# Patient Record
Sex: MALE | Race: WHITE | NOT HISPANIC OR LATINO | Employment: UNEMPLOYED | URBAN - NONMETROPOLITAN AREA
[De-identification: names, ages, dates, MRNs, and addresses within clinical notes are randomized per-mention and may not be internally consistent; named-entity substitution may affect disease eponyms.]

---

## 2022-12-23 ENCOUNTER — HOSPITAL ENCOUNTER (EMERGENCY)
Facility: HOSPITAL | Age: 42
Discharge: HOME | End: 2022-12-23
Attending: EMERGENCY MEDICINE

## 2022-12-23 ENCOUNTER — APPOINTMENT (EMERGENCY)
Dept: CT IMAGING | Facility: HOSPITAL | Age: 42
End: 2022-12-23

## 2022-12-23 VITALS
RESPIRATION RATE: 18 BRPM | OXYGEN SATURATION: 98 % | HEART RATE: 79 BPM | DIASTOLIC BLOOD PRESSURE: 67 MMHG | TEMPERATURE: 96.5 F | SYSTOLIC BLOOD PRESSURE: 113 MMHG

## 2022-12-23 DIAGNOSIS — I95.2 HYPOTENSION DUE TO MEDICATION: ICD-10-CM

## 2022-12-23 DIAGNOSIS — I95.1 ORTHOSTATIC HYPOTENSION: ICD-10-CM

## 2022-12-23 DIAGNOSIS — R42 DIZZINESS: Primary | ICD-10-CM

## 2022-12-23 LAB
ALBUMIN SERPL BCP-MCNC: 3.8 G/DL (ref 3.5–5)
ALP SERPL-CCNC: 90 U/L (ref 46–116)
ALT SERPL W P-5'-P-CCNC: 19 U/L (ref 12–78)
ANION GAP SERPL CALCULATED.3IONS-SCNC: 4 MMOL/L (ref 4–13)
APTT PPP: 28 SECONDS (ref 23–37)
AST SERPL W P-5'-P-CCNC: 13 U/L (ref 5–45)
ATRIAL RATE: 90 BPM
BASOPHILS # BLD AUTO: 0.04 THOUSANDS/ÂΜL (ref 0–0.1)
BASOPHILS NFR BLD AUTO: 1 % (ref 0–1)
BILIRUB SERPL-MCNC: 0.88 MG/DL (ref 0.2–1)
BUN SERPL-MCNC: 18 MG/DL (ref 5–25)
CALCIUM SERPL-MCNC: 8.6 MG/DL (ref 8.3–10.1)
CARDIAC TROPONIN I PNL SERPL HS: <2 NG/L
CHLORIDE SERPL-SCNC: 99 MMOL/L (ref 96–108)
CO2 SERPL-SCNC: 33 MMOL/L (ref 21–32)
CREAT SERPL-MCNC: 0.83 MG/DL (ref 0.6–1.3)
EOSINOPHIL # BLD AUTO: 0.07 THOUSAND/ÂΜL (ref 0–0.61)
EOSINOPHIL NFR BLD AUTO: 1 % (ref 0–6)
ERYTHROCYTE [DISTWIDTH] IN BLOOD BY AUTOMATED COUNT: 13.5 % (ref 11.6–15.1)
FLUAV RNA RESP QL NAA+PROBE: NEGATIVE
FLUBV RNA RESP QL NAA+PROBE: NEGATIVE
GFR SERPL CREATININE-BSD FRML MDRD: 110 ML/MIN/1.73SQ M
GLUCOSE SERPL-MCNC: 148 MG/DL (ref 65–140)
HCT VFR BLD AUTO: 40.2 % (ref 36.5–49.3)
HGB BLD-MCNC: 13.1 G/DL (ref 12–17)
IMM GRANULOCYTES # BLD AUTO: 0.03 THOUSAND/UL (ref 0–0.2)
IMM GRANULOCYTES NFR BLD AUTO: 0 % (ref 0–2)
INR PPP: 1.09 (ref 0.84–1.19)
LYMPHOCYTES # BLD AUTO: 1.88 THOUSANDS/ÂΜL (ref 0.6–4.47)
LYMPHOCYTES NFR BLD AUTO: 27 % (ref 14–44)
MCH RBC QN AUTO: 26.6 PG (ref 26.8–34.3)
MCHC RBC AUTO-ENTMCNC: 32.6 G/DL (ref 31.4–37.4)
MCV RBC AUTO: 82 FL (ref 82–98)
MONOCYTES # BLD AUTO: 0.63 THOUSAND/ÂΜL (ref 0.17–1.22)
MONOCYTES NFR BLD AUTO: 9 % (ref 4–12)
NEUTROPHILS # BLD AUTO: 4.38 THOUSANDS/ÂΜL (ref 1.85–7.62)
NEUTS SEG NFR BLD AUTO: 62 % (ref 43–75)
NRBC BLD AUTO-RTO: 0 /100 WBCS
P AXIS: 69 DEGREES
PLATELET # BLD AUTO: 184 THOUSANDS/UL (ref 149–390)
PMV BLD AUTO: 12 FL (ref 8.9–12.7)
POTASSIUM SERPL-SCNC: 4.2 MMOL/L (ref 3.5–5.3)
PR INTERVAL: 124 MS
PROT SERPL-MCNC: 7.5 G/DL (ref 6.4–8.4)
PROTHROMBIN TIME: 14.3 SECONDS (ref 11.6–14.5)
QRS AXIS: 47 DEGREES
QRSD INTERVAL: 88 MS
QT INTERVAL: 338 MS
QTC INTERVAL: 413 MS
RBC # BLD AUTO: 4.92 MILLION/UL (ref 3.88–5.62)
RSV RNA RESP QL NAA+PROBE: NEGATIVE
SARS-COV-2 RNA RESP QL NAA+PROBE: NEGATIVE
SODIUM SERPL-SCNC: 136 MMOL/L (ref 135–147)
T WAVE AXIS: 50 DEGREES
VENTRICULAR RATE: 90 BPM
WBC # BLD AUTO: 7.03 THOUSAND/UL (ref 4.31–10.16)

## 2022-12-23 RX ORDER — MECLIZINE HCL 12.5 MG/1
25 TABLET ORAL ONCE
Status: COMPLETED | OUTPATIENT
Start: 2022-12-23 | End: 2022-12-23

## 2022-12-23 RX ADMIN — SODIUM CHLORIDE 1000 ML: 0.9 INJECTION, SOLUTION INTRAVENOUS at 17:00

## 2022-12-23 RX ADMIN — MECLIZINE 25 MG: 12.5 TABLET ORAL at 17:01

## 2022-12-23 NOTE — ED PROVIDER NOTES
History  Chief Complaint   Patient presents with   • Dizziness     Patient reports feeling lightheaded and dizzy the past two days  HPI  45-year-old male presenting to emergency room for evaluation of lightheadedness and dizziness x2 days  Past medical history significant for history of stroke in 2016, speech impairment, seizure disorder on Keppra, hypertension, hyperlipidemia, type 2 diabetes, anxiety presenting to the emergency department with concerns for gradual onset of positionally induced lightheadedness and dizziness  Denies inciting event  Reports no recent seizures last seizure greater than 1 year ago  Denies history of similar symptoms in the past   Patient describes the dizziness as occurring only when he gets up quickly he feels if he would get up very slowly he would not have the symptoms  He does not endorse some sensation of room spinning denies any prior history of vertigo  He denies similar symptoms to this as when he had his prior stroke  Patient has been compliant with his lisinopril he is noted to have low but normal blood pressure here in the ER  None       Past Medical History:   Diagnosis Date   • Ambulatory dysfunction    • Anxiety    • Brain injury    • Depression    • Diabetes mellitus ([facility] [address] )    • Fainting    • GERD (gastroesophageal reflux disease)    • Hyperlipemia    • Seizure ([facility] [address] )    • Speech impairment    • Stammer    • Stroke [facility])        History reviewed  No pertinent surgical history  History reviewed  No pertinent family history  I have reviewed and agree with the history as documented  E-Cigarette/Vaping     E-Cigarette/Vaping Substances     Social History     Tobacco Use   • Smoking status: Never   • Smokeless tobacco: Never   Substance Use Topics   • Alcohol use: Never   • Drug use: Never       Review of Systems   Constitutional: Negative for chills and fever  HENT: Negative for congestion, ear pain, sinus pressure, sinus pain and sore throat      Eyes: Negative for pain and visual disturbance  Respiratory: Negative for cough and shortness of breath  Cardiovascular: Negative for chest pain and palpitations  Gastrointestinal: Negative for abdominal pain, diarrhea, nausea and vomiting  Genitourinary: Negative for dysuria, flank pain and hematuria  Musculoskeletal: Negative for arthralgias, back pain and neck pain  Skin: Negative for color change and rash  Neurological: Positive for dizziness and light-headedness  Negative for tremors, seizures, syncope, facial asymmetry, speech difficulty, weakness, numbness and headaches  All other systems reviewed and are negative  Physical Exam  Physical Exam  Vitals and nursing note reviewed  Exam conducted with a chaperone present  Constitutional:       General: He is not in acute distress  Appearance: Normal appearance  He is well-developed  He is obese  He is not diaphoretic  HENT:      Head: Normocephalic and atraumatic  Right Ear: External ear normal       Left Ear: External ear normal       Nose: Nose normal       Mouth/Throat:      Mouth: Mucous membranes are moist       Pharynx: Oropharynx is clear  Eyes:      Extraocular Movements: Extraocular movements intact  Conjunctiva/sclera: Conjunctivae normal       Pupils: Pupils are equal, round, and reactive to light  Comments: No nystagmus   Cardiovascular:      Rate and Rhythm: Normal rate and regular rhythm  Heart sounds: No murmur heard  Pulmonary:      Effort: Pulmonary effort is normal  No respiratory distress  Breath sounds: Normal breath sounds  Abdominal:      Palpations: Abdomen is soft  Tenderness: There is no abdominal tenderness  There is no guarding or rebound  Musculoskeletal:         General: No swelling  Cervical back: Normal range of motion and neck supple  Right lower leg: No edema  Left lower leg: No edema  Skin:     General: Skin is warm and dry        Capillary Refill: Capillary refill takes less than 2 seconds  Neurological:      General: No focal deficit present  Mental Status: He is alert and oriented to person, place, and time  Mental status is at baseline     Psychiatric:         Mood and Affect: Mood normal          Vital Signs  ED Triage Vitals   Temperature Pulse Respirations Blood Pressure SpO2   12/23/22 1532 12/23/22 1532 12/23/22 1532 12/23/22 1532 12/23/22 1532   (!) 96 5 °F (35 8 °C) 99 18 128/69 96 %      Temp Source Heart Rate Source Patient Position - Orthostatic VS BP Location FiO2 (%)   12/23/22 1532 12/23/22 1600 12/23/22 1532 12/23/22 1532 --   Temporal Monitor Sitting Right arm       Pain Score       12/23/22 1532       No Pain           Vitals:    12/23/22 1710 12/23/22 1712 12/23/22 1715 12/23/22 1730   BP: 116/66 117/67 100/60 128/73   Pulse: 79 81 96 76   Patient Position - Orthostatic VS: Lying - Orthostatic VS Sitting - Orthostatic VS Standing - Orthostatic VS Sitting         Visual Acuity      ED Medications  Medications   sodium chloride 0 9 % bolus 1,000 mL (1,000 mL Intravenous New Bag 12/23/22 1700)   meclizine (ANTIVERT) tablet 25 mg (25 mg Oral Given 12/23/22 1701)       Diagnostic Studies  Results Reviewed     Procedure Component Value Units Date/Time    HS Troponin 0hr (reflex protocol) [[id number]]  (Normal) Collected: 12/23/22 1650    Lab Status: Final result Specimen: Blood Updated: 12/23/22 1716     hs TnI 0hr <2 ng/L     Comprehensive metabolic panel [[id number]]  (Abnormal) Collected: 12/23/22 1650    Lab Status: Final result Specimen: Blood Updated: 12/23/22 1707     Sodium 136 mmol/L      Potassium 4 2 mmol/L      Chloride 99 mmol/L      CO2 33 mmol/L      ANION GAP 4 mmol/L      BUN 18 mg/dL      Creatinine 0 83 mg/dL      Glucose 148 mg/dL      Calcium 8 6 mg/dL      AST 13 U/L      ALT 19 U/L      Alkaline Phosphatase 90 U/L      Total Protein 7 5 g/dL      Albumin 3 8 g/dL      Total Bilirubin 0 88 mg/dL      eGFR 110 ml/min/1 73sq m     Narrative:      National Kidney Disease Foundation guidelines for Chronic Kidney Disease (CKD):   •  Stage 1 with normal or high GFR (GFR > 90 mL/min/1 73 square meters)  •  Stage 2 Mild CKD (GFR = 60-89 mL/min/1 73 square meters)  •  Stage 3A Moderate CKD (GFR = 45-59 mL/min/1 73 square meters)  •  Stage 3B Moderate CKD (GFR = 30-44 mL/min/1 73 square meters)  •  Stage 4 Severe CKD (GFR = 15-29 mL/min/1 73 square meters)  •  Stage 5 End Stage CKD (GFR <15 mL/min/1 73 square meters)  Note: GFR calculation is accurate only with a steady state creatinine    Protime-INR [[id number]]  (Normal) Collected: 12/23/22 1650    Lab Status: Final result Specimen: Blood Updated: 12/23/22 1703     Protime 14 3 seconds      INR 1 09    APTT [[id number]]  (Normal) Collected: 12/23/22 1650    Lab Status: Final result Specimen: Blood Updated: 12/23/22 1703     PTT 28 seconds     CBC and differential [[id number]]  (Abnormal) Collected: 12/23/22 1650    Lab Status: Final result Specimen: Blood Updated: 12/23/22 1653     WBC 7 03 Thousand/uL      RBC 4 92 Million/uL      Hemoglobin 13 1 g/dL      Hematocrit 40 2 %      MCV 82 fL      MCH 26 6 pg      MCHC 32 6 g/dL      RDW 13 5 %      MPV 12 0 fL      Platelets 171 Thousands/uL      nRBC 0 /100 WBCs      Neutrophils Relative 62 %      Immat GRANS % 0 %      Lymphocytes Relative 27 %      Monocytes Relative 9 %      Eosinophils Relative 1 %      Basophils Relative 1 %      Neutrophils Absolute 4 38 Thousands/µL      Immature Grans Absolute 0 03 Thousand/uL      Lymphocytes Absolute 1 88 Thousands/µL      Monocytes Absolute 0 63 Thousand/µL      Eosinophils Absolute 0 07 Thousand/µL      Basophils Absolute 0 04 Thousands/µL     Levetiracetam level [[id number]] Collected: 12/23/22 1650    Lab Status:  In process Specimen: Blood Updated: 12/23/22 1650    FLU/RSV/COVID - if FLU/RSV clinically relevant [[id number]]  (Normal) Collected: 12/23/22 1556    Lab Status: Final result Specimen: Nasopharyngeal Swab Updated: 12/23/22 1638     SARS-CoV-2 Negative     INFLUENZA A PCR Negative     INFLUENZA B PCR Negative     RSV PCR Negative    Narrative:      FOR PEDIATRIC PATIENTS - copy/paste COVID Guidelines URL to browser: [email or web address]  [email or web address]    SARS-CoV-2 assay is a Nucleic Acid Amplification assay intended for the  qualitative detection of nucleic acid from SARS-CoV-2 in nasopharyngeal  swabs  Results are for the presumptive identification of SARS-CoV-2 RNA  Positive results are indicative of infection with SARS-CoV-2, the virus  causing COVID-19, but do not rule out bacterial infection or co-infection  with other viruses  Laboratories within the [place] and its  territories are required to report all positive results to the appropriate  public health authorities  Negative results do not preclude SARS-CoV-2  infection and should not be used as the sole basis for treatment or other  patient management decisions  Negative results must be combined with  clinical observations, patient history, and epidemiological information  This test has not been FDA cleared or approved  This test has been authorized by FDA under an Emergency Use Authorization  (EUA)  This test is only authorized for the duration of time the  declaration that circumstances exist justifying the authorization of the  emergency use of an in vitro diagnostic tests for detection of SARS-CoV-2  virus and/or diagnosis of COVID-19 infection under section 564(b)(1) of  the Act, 21 U  S C  436EPB-8(T)(9), unless the authorization is terminated  or revoked sooner  The test has been validated but independent review by FDA  and CLIA is pending  Test performed using [employer] GeneXpert: This RT-PCR assay targets N2,  a region unique to SARS-CoV-2  A conserved region in the E-gene was chosen  for pan-Sarbecovirus detection which includes SARS-CoV-2      According to CMS-2020-01-R, this platform meets the definition of high-throughput technology  CT head without contrast   Final Result by Desi Burnett MD (12/23 1711)         1  Moderate to large region of cystic encephalomalacia and gliosis in the left frontal lobe indicative of old infarction in the anterior cerebral artery vascular territory with associated ex vacuo dilatation of the left lateral ventricle  2   No acute intracranial hemorrhage  Workstation performed: [id number]                    Procedures  Procedures         ED Course  ED Course as of 12/23/22 1757   Fri Dec 23, 2022   1644 Blood Pressure: 109/68   1651 EKG interpreted by myself  EKG dated 12/23/2022 at 1537 demonstrates normal sinus rhythm at 90 bpm, normal SC, QRS, QTc interval, no STEMI    46 Encephalomalacia reflecting old stroke on CT  No acute intracranial hemorrhage                                              MDM  Number of Diagnoses or Management Options  Dizziness: new and requires workup  Hypotension due to medication: new and requires workup  Orthostatic hypotension: new and requires workup     Amount and/or Complexity of Data Reviewed  Clinical lab tests: ordered and reviewed  Tests in the radiology section of CPT®: ordered and reviewed  Tests in the medicine section of CPT®: reviewed and ordered  Decide to obtain previous medical records or to obtain history from someone other than the patient: yes  Obtain history from someone other than the patient: yes  Review and summarize past medical records: yes  Independent visualization of images, tracings, or specimens: yes    Risk of Complications, Morbidity, and/or Mortality  Presenting problems: moderate  Diagnostic procedures: moderate  Management options: moderate    Patient presenting with symptoms concerning for positional dizziness  Does describe some room spinning sensation but altogether the story sounds more like orthostatic hypotension  He is also on lisinopril but is noted to have a low normal blood pressure on arrival to the emergency department  Upon review of the chart in 2021 he had similar symptoms and followed up with his primary doctor who decreased his lisinopril from 20 mg to 10 mg with concerns for medication induced symptoms secondary to hypotension  I suspect he similar situation today  Patient was found a positive orthostatics here he did receive IV fluids screening EKG and labs were unremarkable  Screening CT scan demonstrated a area of encephalomalacia believed to represent his prior infarction no evidence of acute infarct or acute hemorrhage were seen  I discussed with the patient my suspicion that his blood pressure medicine may be contributing to his symptoms as well as component of dehydration  I informed him to continue to drink plenty of fluids to hold his lisinopril over the next few days and to check his blood pressure daily and to report this information to his primary doctor or other prescribing doctor  He is given strict return to ER instructions if he does not improve or worsens over the next several days despite these changes to his medications  If improved with holding antihypertensive, supports likely contributing  I did explain to the patient I cannot completely rule out a primary neurologic process at this time without further workup such as MRI  I think given the high suspicion for other cause of symptoms and reassuring head CT, would recommend outpatient plan and if not improved could return for further workup  He expresses understanding and agreement with this plan           Disposition  Final diagnoses:   Dizziness   Hypotension due to medication   Orthostatic hypotension     Time reflects when diagnosis was documented in both MDM as applicable and the Disposition within this note     Time User Action Codes Description Comment    12/23/2022  4:51 PM Ubaldo Noble Add [R42] Dizziness 12/23/2022  5:45 PM Candida Danger Add [I95 2] Hypotension due to medication     12/23/2022  5:45 PM Candida Danger Add [I95 1] Orthostatic hypotension       ED Disposition     ED Disposition   Discharge    Condition   Stable    Date/Time   Fri Dec 23, 2022  5:50 PM    Comment   Michele Colvin discharge to home/self care  Follow-up Information     Follow up With Specialties Details Why Contact Info Additional Information    Vince Iyer MD Family Medicine Schedule an appointment as soon as possible for a visit in 1 week  [facility] [address] [place], [place] [address] Emergency Department Emergency Medicine Go to  If symptoms worsen [facility] [address] [phone]  [facility] Emergency Department, [address], [address]          Patient's Medications    No medications on file       No discharge procedures on file      PDMP Review     None          ED Provider  Electronically Signed by           Alistair Fuller DO  12/23/22 3944

## 2022-12-23 NOTE — DISCHARGE INSTRUCTIONS
Thank you for visiting the Emergency Department today  Your CT scan does not reveal any new signs of stroke or bleeding  Old area of prior stroke seen  Your blood pressure was found to drop with position change  I think yours symptoms may be due to unintended effects of the blood pressure medication  Possibly a component of dehydration  Your labs otherwise were normal    Your EKG was normal   Viral swabs for Flu, COVID negative    At this point I recommend trial of outpatient plan:  1) hold your lisinopril and monitor your symptoms and check your blood pressure daily  2) If you feel improved contact your PCP for follow up and blood pressure medication adjustment  3) drink plenty of fluids    IF YOU DO NOT GET BETTER OR YOU GET WORSE RETURN TO ER FOR FURTHER WORKUP

## 2022-12-23 NOTE — ED NOTES
Awaiting ride home  Spoke with sister Murphy House on the phone and reviewed all d/c nstructions with her       Amadou Gomez RN  12/23/22 [id number]

## 2022-12-27 LAB
ATRIAL RATE: 90 BPM
P AXIS: 69 DEGREES
PR INTERVAL: 124 MS
QRS AXIS: 47 DEGREES
QRSD INTERVAL: 88 MS
QT INTERVAL: 338 MS
QTC INTERVAL: 413 MS
T WAVE AXIS: 50 DEGREES
VENTRICULAR RATE: 90 BPM

## 2022-12-28 LAB — LEVETIRACETAM SERPL-MCNC: 25.3 UG/ML (ref 10–40)

## 2023-11-29 ENCOUNTER — HOSPITAL ENCOUNTER (EMERGENCY)
Facility: HOSPITAL | Age: 43
Discharge: HOME | End: 2023-11-29
Attending: EMERGENCY MEDICINE
Payer: COMMERCIAL

## 2023-11-29 VITALS
RESPIRATION RATE: 16 BRPM | OXYGEN SATURATION: 97 % | SYSTOLIC BLOOD PRESSURE: 136 MMHG | WEIGHT: 297.18 LBS | TEMPERATURE: 97.9 F | DIASTOLIC BLOOD PRESSURE: 97 MMHG | HEART RATE: 78 BPM

## 2023-11-29 DIAGNOSIS — R04.0 EPISTAXIS: Primary | ICD-10-CM

## 2023-11-29 PROCEDURE — 30901 CONTROL OF NOSEBLEED: CPT | Performed by: PHYSICIAN ASSISTANT

## 2023-11-29 PROCEDURE — 99282 EMERGENCY DEPT VISIT SF MDM: CPT

## 2023-11-29 PROCEDURE — 99284 EMERGENCY DEPT VISIT MOD MDM: CPT | Performed by: PHYSICIAN ASSISTANT

## 2023-11-29 RX ORDER — CEPHALEXIN 500 MG/1
500 CAPSULE ORAL EVERY 6 HOURS SCHEDULED
Qty: 12 CAPSULE | Refills: 0 | Status: SHIPPED | OUTPATIENT
Start: 2023-11-29 | End: 2023-12-02

## 2023-11-29 RX ORDER — TRANEXAMIC ACID 100 MG/ML
1000 INJECTION, SOLUTION INTRAVENOUS ONCE
Status: COMPLETED | OUTPATIENT
Start: 2023-11-29 | End: 2023-11-29

## 2023-11-29 RX ADMIN — TRANEXAMIC ACID 1000 MG: 100 INJECTION, SOLUTION INTRAVENOUS at 17:05

## 2023-11-29 NOTE — ED PROVIDER NOTES
History  Chief Complaint   Patient presents with    Nose Bleed     Pt states nose bleed started about an hour ago     This is a 24-year-old male who is here for evaluation of right-sided nosebleed. This was nontraumatic in nature. Occurred about an hour and a half prior to arrival here. Was sitting at home when the nosebleed started. History of nosebleeds in the past he does take baby aspirin daily. History of stroke in the past.  Presents with family. Nontoxic-appearing at bedside. None       Past Medical History:   Diagnosis Date    Ambulatory dysfunction     Anxiety     Brain injury ([address])     Depression     Diabetes mellitus ([address])     Fainting     GERD (gastroesophageal reflux disease)     Hyperlipemia     Seizure ([address])     Speech impairment     Stammer     Stroke [facility])        History reviewed. No pertinent surgical history. History reviewed. No pertinent family history. I have reviewed and agree with the history as documented. E-Cigarette/Vaping     E-Cigarette/Vaping Substances     Social History     Tobacco Use    Smoking status: Never    Smokeless tobacco: Never   Substance Use Topics    Alcohol use: Never    Drug use: Never       Review of Systems   Constitutional: Negative. Negative for activity change, appetite change, chills, diaphoresis, fatigue, fever and unexpected weight change. HENT:  Positive for nosebleeds. Negative for sore throat, trouble swallowing and voice change. Eyes: Negative. Respiratory: Negative. Negative for cough, chest tightness, shortness of breath and wheezing. Cardiovascular: Negative. Negative for chest pain, palpitations and leg swelling. Gastrointestinal: Negative. Negative for abdominal pain, blood in stool, nausea and vomiting. Endocrine: Negative. Genitourinary: Negative. Negative for flank pain and hematuria. Musculoskeletal: Negative.   Negative for arthralgias, back pain, gait problem, joint swelling, myalgias, neck pain and neck stiffness. Skin: Negative. Negative for rash and wound. Allergic/Immunologic: Negative. Neurological: Negative. Negative for dizziness, seizures, syncope, weakness, light-headedness and headaches. Hematological: Negative. Psychiatric/Behavioral: Negative. All other systems reviewed and are negative. Physical Exam  Physical Exam  Vitals reviewed. Constitutional:       General: He is not in acute distress. Appearance: Normal appearance. He is not ill-appearing, toxic-appearing or diaphoretic. HENT:      Head: Normocephalic and atraumatic. Right Ear: External ear normal.      Left Ear: External ear normal.      Nose:      Right Nostril: Epistaxis present. Eyes:      General: No scleral icterus. Right eye: No discharge. Left eye: No discharge. Extraocular Movements: Extraocular movements intact. Conjunctiva/sclera: Conjunctivae normal.   Cardiovascular:      Rate and Rhythm: Normal rate. Pulses: Normal pulses. Pulmonary:      Effort: Pulmonary effort is normal. No respiratory distress. Breath sounds: No stridor. Musculoskeletal:         General: No deformity or signs of injury. Cervical back: Normal range of motion. No rigidity. Skin:     General: Skin is warm. Coloration: Skin is not jaundiced. Findings: No lesion or rash. Neurological:      General: No focal deficit present. Mental Status: He is alert and oriented to person, place, and time. Mental status is at baseline. Gait: Gait normal.   Psychiatric:         Mood and Affect: Mood normal.         Thought Content:  Thought content normal.         Judgment: Judgment normal.         Vital Signs  ED Triage Vitals [11/29/23 1648]   Temperature Pulse Respirations Blood Pressure SpO2   97.9 °F (36.6 °C) 84 17 136/97 97 %      Temp Source Heart Rate Source Patient Position - Orthostatic VS BP Location FiO2 (%)   Temporal Monitor Sitting Right arm --      Pain Score No Pain           Vitals:    11/29/23 1648 11/29/23 1700   BP: 136/97 136/97   Pulse: 84 78   Patient Position - Orthostatic VS: Sitting          Visual Acuity      ED Medications  Medications   tranexamic acid 100mg/mL (for epistaxis) 1,000 mg (1,000 mg Nasal Given 11/29/23 1705)       Diagnostic Studies  Results Reviewed       None                   No orders to display              Procedures  Epistaxis management    Date/Time: 11/29/2023 5:10 PM    Performed by: Jhony Garcia PA-C  Authorized by: Jhony Garcia PA-C  Universal Protocol:  Consent: Verbal consent obtained. Consent given by: patient  Patient identity confirmed: verbally with patient    Patient location:  Bedside  Anesthesia (see MAR for exact dosages): Anesthesia method:  None  Procedure details:     Treatment site:  R anterior    Hemostasis method:  Anterior pack    Approach:  External    Spec Headlamp used: No      Treatment complexity:  Limited    Treatment episode: initial    Post-procedure details:     Assessment:  Bleeding stopped    Patient tolerance of procedure: Tolerated well, no immediate complications           ED Course                               SBIRT 20yo+      Flowsheet Row Most Recent Value   Initial Alcohol Screen: US AUDIT-C     1. How often do you have a drink containing alcohol? 0 Filed at: 11/29/2023 1648   2. How many drinks containing alcohol do you have on a typical day you are drinking? 0 Filed at: 11/29/2023 1648   3a. Male UNDER 65: How often do you have five or more drinks on one occasion? 0 Filed at: 11/29/2023 1648   3b. FEMALE Any Age, or MALE 65+: How often do you have 4 or more drinks on one occassion? 0 Filed at: 11/29/2023 1648   Audit-C Score 0 Filed at: 11/29/2023 1648   KASSY: How many times in the past year have you. .. Used an illegal drug or used a prescription medication for non-medical reasons?  Never Filed at: 11/29/2023 1648                      Medical Decision Making  61-year-old male here for evaluation of nontraumatic right-sided nosebleed that started an hour and a half prior to arrival he does take baby aspirin daily in the morning. Did take his dose this morning, history of nosebleeds in the past has required packing in the past.    We will evaluate for hemodynamic stability and achieve hemostasis. Patient was evaluated 30 minutes post packing placement he is doing well there is no further bleeding there is no left-sided bleeding no posterior pharyngeal bleeding stable for discharge home will return in 2 days. Will cover with antibiotics prophylactically to prevent nasal infection. Risk  Prescription drug management. Disposition  Final diagnoses:   Epistaxis     Time reflects when diagnosis was documented in both MDM as applicable and the Disposition within this note       Time User Action Codes Description Comment    11/29/2023  5:11 PM Lyndsay Deanna Add [R04.0] Epistaxis           ED Disposition       ED Disposition   Discharge    Condition   Stable    Date/Time   Wed Nov 29, 2023 1732    [facility] discharge to home/self care. Follow-up Information       Follow up With Specialties Details Why [facility] Emergency Department Emergency Medicine Go in 2 days for packing removal [address] [place] [phone]  [address] [place] [address] Emergency Department, [address] [place] [address], [phone]            Patient's Medications   Discharge Prescriptions    CEPHALEXIN (KEFLEX) 500 MG CAPSULE    Take 1 capsule (500 mg total) by mouth every 6 (six) hours for 3 days       Start Date: 11/29/2023End Date: 12/2/2023       Order Dose: 500 mg       Quantity: 12 capsule    Refills: 0       No discharge procedures on file.     PDMP Review       None            ED Provider  Electronically Signed by             Boo Mcmahon Bertrand Hashimoto, PA-C  11/29/23 1736